# Patient Record
Sex: MALE | Race: OTHER | HISPANIC OR LATINO | Employment: FULL TIME | URBAN - METROPOLITAN AREA
[De-identification: names, ages, dates, MRNs, and addresses within clinical notes are randomized per-mention and may not be internally consistent; named-entity substitution may affect disease eponyms.]

---

## 2017-01-20 ENCOUNTER — APPOINTMENT (OUTPATIENT)
Dept: GENERAL RADIOLOGY | Age: 42
End: 2017-01-20
Attending: EMERGENCY MEDICINE
Payer: COMMERCIAL

## 2017-01-20 ENCOUNTER — HOSPITAL ENCOUNTER (EMERGENCY)
Age: 42
Discharge: HOME | End: 2017-01-21
Attending: EMERGENCY MEDICINE
Payer: COMMERCIAL

## 2017-01-20 DIAGNOSIS — R68.89 FLU-LIKE SYMPTOMS: ICD-10-CM

## 2017-01-20 DIAGNOSIS — J20.9 ACUTE BRONCHITIS, UNSPECIFIED ORGANISM: Primary | ICD-10-CM

## 2017-01-20 LAB
FLUAV AG NPH QL IA: NEGATIVE
FLUBV AG NPH QL IA: NEGATIVE

## 2017-01-20 PROCEDURE — 96375 TX/PRO/DX INJ NEW DRUG ADDON: CPT | Performed by: EMERGENCY MEDICINE

## 2017-01-20 PROCEDURE — 74011250636 HC RX REV CODE- 250/636: Performed by: EMERGENCY MEDICINE

## 2017-01-20 PROCEDURE — 96374 THER/PROPH/DIAG INJ IV PUSH: CPT | Performed by: EMERGENCY MEDICINE

## 2017-01-20 PROCEDURE — 99284 EMERGENCY DEPT VISIT MOD MDM: CPT | Performed by: EMERGENCY MEDICINE

## 2017-01-20 PROCEDURE — 71020 XR CHEST PA LAT: CPT

## 2017-01-20 PROCEDURE — 87804 INFLUENZA ASSAY W/OPTIC: CPT | Performed by: EMERGENCY MEDICINE

## 2017-01-20 PROCEDURE — 96361 HYDRATE IV INFUSION ADD-ON: CPT | Performed by: EMERGENCY MEDICINE

## 2017-01-20 PROCEDURE — 74011250637 HC RX REV CODE- 250/637: Performed by: EMERGENCY MEDICINE

## 2017-01-20 RX ORDER — DOXYCYCLINE 100 MG/1
100 CAPSULE ORAL
Status: COMPLETED | OUTPATIENT
Start: 2017-01-20 | End: 2017-01-21

## 2017-01-20 RX ORDER — DEXAMETHASONE SODIUM PHOSPHATE 100 MG/10ML
10 INJECTION INTRAMUSCULAR; INTRAVENOUS ONCE
Status: COMPLETED | OUTPATIENT
Start: 2017-01-20 | End: 2017-01-20

## 2017-01-20 RX ORDER — BENZONATATE 200 MG/1
200 CAPSULE ORAL
Qty: 21 CAP | Refills: 0 | Status: SHIPPED | OUTPATIENT
Start: 2017-01-20 | End: 2017-01-27

## 2017-01-20 RX ORDER — NAPROXEN SODIUM 550 MG/1
550 TABLET ORAL 2 TIMES DAILY WITH MEALS
Qty: 20 TAB | Refills: 0 | Status: SHIPPED | OUTPATIENT
Start: 2017-01-20 | End: 2019-02-04

## 2017-01-20 RX ORDER — ALBUTEROL SULFATE 90 UG/1
2 AEROSOL, METERED RESPIRATORY (INHALATION)
Qty: 1 INHALER | Refills: 0 | Status: SHIPPED | OUTPATIENT
Start: 2017-01-20 | End: 2019-02-04

## 2017-01-20 RX ORDER — BENZONATATE 100 MG/1
200 CAPSULE ORAL
Status: COMPLETED | OUTPATIENT
Start: 2017-01-20 | End: 2017-01-20

## 2017-01-20 RX ORDER — KETOROLAC TROMETHAMINE 30 MG/ML
30 INJECTION, SOLUTION INTRAMUSCULAR; INTRAVENOUS
Status: COMPLETED | OUTPATIENT
Start: 2017-01-20 | End: 2017-01-20

## 2017-01-20 RX ORDER — DOXYCYCLINE HYCLATE 100 MG
100 TABLET ORAL 2 TIMES DAILY
Qty: 14 TAB | Refills: 0 | Status: SHIPPED | OUTPATIENT
Start: 2017-01-20 | End: 2017-01-27

## 2017-01-20 RX ADMIN — SODIUM CHLORIDE 1000 ML: 900 INJECTION, SOLUTION INTRAVENOUS at 23:40

## 2017-01-20 RX ADMIN — KETOROLAC TROMETHAMINE 30 MG: 30 INJECTION, SOLUTION INTRAMUSCULAR; INTRAVENOUS at 23:40

## 2017-01-20 RX ADMIN — BENZONATATE 200 MG: 100 CAPSULE ORAL at 23:40

## 2017-01-20 RX ADMIN — DEXAMETHASONE SODIUM PHOSPHATE 10 MG: 10 INJECTION INTRAMUSCULAR; INTRAVENOUS at 23:40

## 2017-01-21 VITALS
SYSTOLIC BLOOD PRESSURE: 106 MMHG | HEART RATE: 102 BPM | TEMPERATURE: 99.1 F | RESPIRATION RATE: 18 BRPM | OXYGEN SATURATION: 96 % | HEIGHT: 64 IN | WEIGHT: 148 LBS | DIASTOLIC BLOOD PRESSURE: 64 MMHG | BODY MASS INDEX: 25.27 KG/M2

## 2017-01-21 PROCEDURE — 74011250637 HC RX REV CODE- 250/637: Performed by: EMERGENCY MEDICINE

## 2017-01-21 RX ADMIN — DOXYCYCLINE HYCLATE 100 MG: 100 CAPSULE ORAL at 00:10

## 2017-01-21 NOTE — ED PROVIDER NOTES
Patient is a 37 y.o. male presenting with cough. The history is provided by the patient. Cough   The current episode started more than 2 days ago. The problem occurs constantly. The problem has not changed since onset. The cough is productive of sputum. Patient reports a subjective fever - was not measured. Associated symptoms include headaches, sore throat, myalgias and shortness of breath. Pertinent negatives include no chest pain, no eye redness, no rhinorrhea, no wheezing, no nausea and no vomiting. He has tried nothing for the symptoms. He is a smoker. His past medical history does not include pneumonia or asthma. Past Medical History:   Diagnosis Date    GERD (gastroesophageal reflux disease)     Hypercholesterolemia        History reviewed. No pertinent past surgical history. History reviewed. No pertinent family history. Social History     Social History    Marital status:      Spouse name: N/A    Number of children: N/A    Years of education: N/A     Occupational History    Not on file. Social History Main Topics    Smoking status: Current Every Day Smoker    Smokeless tobacco: Never Used    Alcohol use Not on file    Drug use: Not on file    Sexual activity: Not on file     Other Topics Concern    Not on file     Social History Narrative         ALLERGIES: Review of patient's allergies indicates no known allergies. Review of Systems   Constitutional: Positive for fatigue and fever. HENT: Positive for sore throat. Negative for rhinorrhea. Eyes: Negative for discharge and redness. Respiratory: Positive for cough and shortness of breath. Negative for wheezing. Cardiovascular: Negative for chest pain and palpitations. Gastrointestinal: Negative for abdominal pain, nausea and vomiting. Musculoskeletal: Positive for arthralgias, back pain and myalgias. Skin: Negative for rash. Neurological: Positive for headaches. Negative for dizziness.    All other systems reviewed and are negative. Vitals:    01/20/17 2124   BP: 133/77   Pulse: (!) 102   Resp: 18   Temp: 99.1 °F (37.3 °C)   SpO2: 97%   Weight: 67.1 kg (148 lb)   Height: 5' 4\" (1.626 m)            Physical Exam   Constitutional: He is oriented to person, place, and time. He appears well-developed and well-nourished. No distress. Appears to feel a little puny, but no distress     HENT:   Head: Normocephalic and atraumatic. Eyes: Conjunctivae and EOM are normal. Pupils are equal, round, and reactive to light. Right eye exhibits no discharge. Left eye exhibits no discharge. No scleral icterus. Neck: Normal range of motion. Neck supple. Cardiovascular: Normal rate, regular rhythm and normal heart sounds. Exam reveals no gallop. No murmur heard. Pulmonary/Chest: Effort normal and breath sounds normal. No respiratory distress. He has no wheezes. He has no rales. He exhibits no tenderness. Abdominal: Soft. Bowel sounds are normal. There is no tenderness. There is no guarding. Musculoskeletal: Normal range of motion. He exhibits no edema. Neurological: He is alert and oriented to person, place, and time. He exhibits normal muscle tone. cni 2-12 grossly   Skin: Skin is warm and dry. He is not diaphoretic. Psychiatric: He has a normal mood and affect. His behavior is normal.   Nursing note and vitals reviewed. MDM  Number of Diagnoses or Management Options  Diagnosis management comments: Medical decision making note:  Cough and body aches  Flu test negative. Chest x-ray negative  Treat for bronchitis  This concludes the \"medical decision making note\" part of this emergency department visit note.       ED Course       Procedures

## 2017-01-21 NOTE — DISCHARGE INSTRUCTIONS
Use inhlaer 2 puffs every 6 hours  1,200mg mucinex DM every 12 hours for a week. Try a sustained release sudafed every morning,  Quit smoking  Drink plenty of fluids  Bronchitis: Care Instructions  Your Care Instructions    Bronchitis is inflammation of the bronchial tubes, which carry air to the lungs. The tubes swell and produce mucus, or phlegm. The mucus and inflamed bronchial tubes make you cough. You may have trouble breathing. Most cases of bronchitis are caused by viruses like those that cause colds. Antibiotics usually do not help and they may be harmful. Bronchitis usually develops rapidly and lasts about 2 to 3 weeks in otherwise healthy people. Follow-up care is a key part of your treatment and safety. Be sure to make and go to all appointments, and call your doctor if you are having problems. It's also a good idea to know your test results and keep a list of the medicines you take. How can you care for yourself at home? · Take all medicines exactly as prescribed. Call your doctor if you think you are having a problem with your medicine. · Get some extra rest.  · Take an over-the-counter pain medicine, such as acetaminophen (Tylenol), ibuprofen (Advil, Motrin), or naproxen (Aleve) to reduce fever and relieve body aches. Read and follow all instructions on the label. · Do not take two or more pain medicines at the same time unless the doctor told you to. Many pain medicines have acetaminophen, which is Tylenol. Too much acetaminophen (Tylenol) can be harmful. · Take an over-the-counter cough medicine that contains dextromethorphan to help quiet a dry, hacking cough so that you can sleep. Avoid cough medicines that have more than one active ingredient. Read and follow all instructions on the label. · Breathe moist air from a humidifier, hot shower, or sink filled with hot water. The heat and moisture will thin mucus so you can cough it out. · Do not smoke. Smoking can make bronchitis worse. If you need help quitting, talk to your doctor about stop-smoking programs and medicines. These can increase your chances of quitting for good. When should you call for help? Call 911 anytime you think you may need emergency care. For example, call if:  · You have severe trouble breathing. Call your doctor now or seek immediate medical care if:  · You have new or worse trouble breathing. · You cough up dark brown or bloody mucus (sputum). · You have a new or higher fever. · You have a new rash. Watch closely for changes in your health, and be sure to contact your doctor if:  · You cough more deeply or more often, especially if you notice more mucus or a change in the color of your mucus. · You are not getting better as expected. Where can you learn more? Go to http://harvey-roma.info/. Enter H333 in the search box to learn more about \"Bronchitis: Care Instructions. \"  Current as of: May 23, 2016  Content Version: 11.1  © 6633-9963 [employer]. Care instructions adapted under license by [employer] (which disclaims liability or warranty for this information). If you have questions about a medical condition or this instruction, always ask your healthcare professional. [place] [address] any warranty or liability for your use of this information.

## 2017-01-21 NOTE — ED NOTES
I have reviewed discharge instructions with the patient. Patient verbalizes understanding. Opportunity for questions provided. Prescriptions in hand. Patient ambulatory off the unit. No distress noted at this time.

## 2017-01-21 NOTE — ED TRIAGE NOTES
Patient states that he has been feeling sick for the past few days. States productive cough with yellow sputum.   Also complains of HA and general body aches

## 2019-02-04 ENCOUNTER — APPOINTMENT (OUTPATIENT)
Dept: GENERAL RADIOLOGY | Age: 44
End: 2019-02-04
Payer: COMMERCIAL

## 2019-02-04 ENCOUNTER — HOSPITAL ENCOUNTER (EMERGENCY)
Age: 44
Discharge: HOME | End: 2019-02-04
Attending: EMERGENCY MEDICINE
Payer: COMMERCIAL

## 2019-02-04 VITALS
BODY MASS INDEX: 28.17 KG/M2 | WEIGHT: 165 LBS | DIASTOLIC BLOOD PRESSURE: 86 MMHG | OXYGEN SATURATION: 98 % | HEIGHT: 64 IN | RESPIRATION RATE: 20 BRPM | HEART RATE: 88 BPM | TEMPERATURE: 99.2 F | SYSTOLIC BLOOD PRESSURE: 133 MMHG

## 2019-02-04 DIAGNOSIS — M62.830 LUMBAR PARASPINAL MUSCLE SPASM: Primary | ICD-10-CM

## 2019-02-04 PROCEDURE — 75810000123 HC INJ'S ANES/STEROID AGT PERIPH NERVE: Performed by: EMERGENCY MEDICINE

## 2019-02-04 PROCEDURE — 99282 EMERGENCY DEPT VISIT SF MDM: CPT | Performed by: EMERGENCY MEDICINE

## 2019-02-04 PROCEDURE — 74011000250 HC RX REV CODE- 250: Performed by: EMERGENCY MEDICINE

## 2019-02-04 RX ORDER — BUPIVACAINE HYDROCHLORIDE 2.5 MG/ML
10 INJECTION, SOLUTION EPIDURAL; INFILTRATION; INTRACAUDAL ONCE
Status: DISCONTINUED | OUTPATIENT
Start: 2019-02-04 | End: 2019-02-04 | Stop reason: ALTCHOICE

## 2019-02-04 RX ORDER — CYCLOBENZAPRINE HCL 10 MG
10 TABLET ORAL
Qty: 12 TAB | Refills: 0 | Status: SHIPPED | OUTPATIENT
Start: 2019-02-04 | End: 2019-07-01

## 2019-02-04 RX ORDER — LIDOCAINE 50 MG/G
1 PATCH TOPICAL EVERY 24 HOURS
Qty: 5 EACH | Refills: 0 | Status: SHIPPED | OUTPATIENT
Start: 2019-02-04 | End: 2020-06-20

## 2019-02-04 RX ORDER — IBUPROFEN 600 MG/1
600 TABLET ORAL
Qty: 21 TAB | Refills: 0 | Status: SHIPPED | OUTPATIENT
Start: 2019-02-04 | End: 2019-02-11

## 2019-02-04 RX ORDER — BUPIVACAINE HYDROCHLORIDE 7.5 MG/ML
10 INJECTION, SOLUTION EPIDURAL; RETROBULBAR ONCE
Status: COMPLETED | OUTPATIENT
Start: 2019-02-04 | End: 2019-02-04

## 2019-02-04 RX ADMIN — BUPIVACAINE HYDROCHLORIDE 75 MG: 7.5 INJECTION, SOLUTION EPIDURAL; RETROBULBAR at 13:45

## 2019-02-04 NOTE — ED NOTES
I have reviewed discharge instructions with the patient. The patient verbalized understanding. Patient left ED via Discharge Method: ambulatory to Home with self. Opportunity for questions and clarification provided. Patient given 3 scripts. To continue your aftercare when you leave the hospital, you may receive an automated call from our care team to check in on how you are doing. This is a free service and part of our promise to provide the best care and service to meet your aftercare needs.  If you have questions, or wish to unsubscribe from this service please call [phone]. Thank you for Choosing our [facility] Emergency Department.

## 2019-02-04 NOTE — ED PROVIDER NOTES
Victoriano Morales is a 44 y.o. male seen on 2/4/2019 at 1:22 PM in the [facility] EMERGENCY DEPT in room V03/V03. Chief Complaint Patient presents with  Back Pain HPI:  L lower back pain x 1-2 weeks. Hx of remote back injury. Notes this happens about once a year, worse with movement. No recent hx of trauma. No numbness or tingling between legs. No weakness in legs. Has not taken anything at home. No incontinence. No fever. Historian: patient REVIEW OF SYSTEMS Review of Systems Genitourinary: Negative for flank pain and frequency. Musculoskeletal: Positive for back pain. Negative for joint swelling, myalgias, neck pain and neck stiffness. PAST MEDICAL HISTORY Past Medical History:  
Diagnosis Date  GERD (gastroesophageal reflux disease)  Hypercholesterolemia History reviewed. No pertinent surgical history. Social History Socioeconomic History  Marital status:  Spouse name: Not on file  Number of children: Not on file  Years of education: Not on file  Highest education level: Not on file Tobacco Use  Smoking status: Current Every Day Smoker Packs/day: 0.25 Years: 0.50 Pack years: 0.12  Smokeless tobacco: Never Used Substance and Sexual Activity  Alcohol use: No  
  Alcohol/week: 0.0 oz  Drug use: No  
Social History Narrative ** Merged History Encounter **  
    
 ** Merged History Encounter ** None No Known Allergies PHYSICAL EXAM    
 
Vitals:  
 02/04/19 1150 BP: 133/86 Pulse: 88 Resp: 20 Temp: 99.2 °F (37.3 °C) SpO2: 98% Vital signs were reviewed. Physical Exam  
Constitutional: He is oriented to person, place, and time. He appears well-developed and well-nourished. No distress. HENT:  
Head: Normocephalic and atraumatic. Eyes: EOM are normal. Pupils are equal, round, and reactive to light. Neck: Normal range of motion. Cardiovascular: Intact distal pulses. Pulmonary/Chest: Effort normal.  
Musculoskeletal: He exhibits no edema or deformity. Limited range of motion of lumbar spine, palpable muscle spasm of left lumbar paraspinous musculature, TTP, no midline TTP, no stepoffs or deformities. Neurological: He is alert and oriented to person, place, and time. No cranial nerve deficit or sensory deficit. He exhibits normal muscle tone. Coordination normal.  
5+ strength in bilateral LE include dorsi/plantar flex of toes bilaterally, no sensory deficits including in perineum, Psychiatric: He has a normal mood and affect. His behavior is normal.  
Vitals reviewed. MEDICAL DECISION MAKING Differential Diagnosis: MDM Procedures ED Course:  Pt has palpable muscle spasm. No recent trauma. No imagine necessary. Will provide trigger point injection of marcaine given the significance of his muscle spasm. Will give lidoderm patch, flexeril, and ibuprofen. Ice back. Pt presents to ER complaining of back pain. No history of trauma, no midline TTP. No indication for imaging at this time. No saddle anesthesia, no weakness in lower extremities, no incontinence. I have very low suspicion for caudae equina, acute cord compression syndrome, or other acute process at this time. Return precautions have been given. I have recommended early stretching and mobilization, anti-inflammatory tx's, and fu with primary care. Procedure Note: Trigger point injection Risks and benefits discussed. Area was prepped with chloraprep. 2.5ml of 75% marcaine was instilled into the spasmed muscle at two separate levels for total of 5mL . Pt tolerated well. Disposition:  Dc to home Diagnosis:  Acute lumbar muscle spasm 
____________________________________________________________________ A portion of this note was generated using voice recognition dictation software. While the note has been reviewed for accuracy, please note certain words and phrasses may not be transcribed as intended that some grammatical and/or typographical errors may be present.

## 2019-03-15 ENCOUNTER — HOSPITAL ENCOUNTER (OUTPATIENT)
Dept: MRI IMAGING | Age: 44
Discharge: HOME | End: 2019-03-15
Attending: FAMILY MEDICINE
Payer: COMMERCIAL

## 2019-03-15 DIAGNOSIS — G89.29 CHRONIC BILATERAL LOW BACK PAIN WITH RIGHT-SIDED SCIATICA: ICD-10-CM

## 2019-03-15 DIAGNOSIS — M54.41 CHRONIC BILATERAL LOW BACK PAIN WITH RIGHT-SIDED SCIATICA: ICD-10-CM

## 2019-03-15 PROCEDURE — 72146 MRI CHEST SPINE W/O DYE: CPT

## 2019-05-02 ENCOUNTER — HOSPITAL ENCOUNTER (EMERGENCY)
Age: 44
Discharge: HOME | End: 2019-05-02
Payer: COMMERCIAL

## 2019-05-02 VITALS
RESPIRATION RATE: 20 BRPM | OXYGEN SATURATION: 95 % | BODY MASS INDEX: 26.12 KG/M2 | HEART RATE: 105 BPM | DIASTOLIC BLOOD PRESSURE: 90 MMHG | WEIGHT: 153 LBS | HEIGHT: 64 IN | SYSTOLIC BLOOD PRESSURE: 141 MMHG | TEMPERATURE: 98.5 F

## 2019-05-02 DIAGNOSIS — J02.9 PHARYNGITIS, UNSPECIFIED ETIOLOGY: Primary | ICD-10-CM

## 2019-05-02 PROCEDURE — 99283 EMERGENCY DEPT VISIT LOW MDM: CPT

## 2019-05-02 RX ORDER — AMOXICILLIN 500 MG/1
500 TABLET, FILM COATED ORAL 3 TIMES DAILY
Qty: 30 TAB | Refills: 0 | Status: SHIPPED | OUTPATIENT
Start: 2019-05-02 | End: 2020-06-20

## 2019-05-02 NOTE — DISCHARGE INSTRUCTIONS
Patient Education        Sore Throat: Care Instructions  Your Care Instructions    Infection by bacteria or a virus causes most sore throats. Cigarette smoke, dry air, air pollution, allergies, and yelling can also cause a sore throat. Sore throats can be painful and annoying. Fortunately, most sore throats go away on their own. If you have a bacterial infection, your doctor may prescribe antibiotics. Follow-up care is a key part of your treatment and safety. Be sure to make and go to all appointments, and call your doctor if you are having problems. It's also a good idea to know your test results and keep a list of the medicines you take. How can you care for yourself at home? · If your doctor prescribed antibiotics, take them as directed. Do not stop taking them just because you feel better. You need to take the full course of antibiotics. · Gargle with warm salt water once an hour to help reduce swelling and relieve discomfort. Use 1 teaspoon of salt mixed in 1 cup of warm water. · Take an over-the-counter pain medicine, such as acetaminophen (Tylenol), ibuprofen (Advil, Motrin), or naproxen (Aleve). Read and follow all instructions on the label. · Be careful when taking over-the-counter cold or flu medicines and Tylenol at the same time. Many of these medicines have acetaminophen, which is Tylenol. Read the labels to make sure that you are not taking more than the recommended dose. Too much acetaminophen (Tylenol) can be harmful. · Drink plenty of fluids. Fluids may help soothe an irritated throat. Hot fluids, such as tea or soup, may help decrease throat pain. · Use over-the-counter throat lozenges to soothe pain. Regular cough drops or hard candy may also help. These should not be given to young children because of the risk of choking. · Do not smoke or allow others to smoke around you. If you need help quitting, talk to your doctor about stop-smoking programs and medicines.  These can increase your chances of quitting for good. · Use a vaporizer or humidifier to add moisture to your bedroom. Follow the directions for cleaning the machine. When should you call for help? Call your doctor now or seek immediate medical care if:    · You have new or worse trouble swallowing.     · Your sore throat gets much worse on one side.    Watch closely for changes in your health, and be sure to contact your doctor if you do not get better as expected. Where can you learn more? Go to http://harvey-roma.info/. Enter 062 [id number] in the search box to learn more about \"Sore Throat: Care Instructions. \"  Current as of: March 27, 2018  Content Version: 11.9  © 2006-2018 [employer]. Care instructions adapted under license by [employer] (which disclaims liability or warranty for this information). If you have questions about a medical condition or this instruction, always ask your healthcare professional. [place] [address] any warranty or liability for your use of this information. Patient Education        Dolor de garganta: Instrucciones de cuidado - [ Sore Throat: Care Instructions ]  Instrucciones de cuidado    Sumeet infección por un virus o sumeet bacteria causa la mayoría de los afshan de garganta. El humo del cigarrillo, el aire seco, el aire contaminado, las [place] y gritar también pueden causar dolor de garganta. El dolor de garganta puede ser intenso y [place]. Por jamari, la mayoría de los afshan de garganta desaparecen por sí mismos. Si tiene [employer], el médico podría recetarle antibióticos. La atención de seguimiento es sumeet parte clave de paiz tratamiento y seguridad. Asegúrese de hacer y acudir a todas las citas, y llame a paiz médico si está teniendo problemas. También es sumeet buena idea saber los resultados de delia exámenes y mantener sumeet lista de los medicamentos que beata. ¿Cómo puede cuidarse en el hogar?   · Si paiz médico le recetó antibióticos, tómelos según las indicaciones. No deje de tomarlos por el hecho de sentirse mejor. Debe peewee todos los antibióticos hasta terminarlos. · Marco A gárgaras de agua salada tibia sumeet vez cada hora para ayudar a reducir la hinchazón y aliviar la molestia. Mezcle 1 cucharadita de sal en 1 taza de agua tibia. · [place] un analgésico (medicamento para el dolor) de venta vasiliy, gita acetaminofén (Tylenol), ibuprofeno (Advil, Motrin) o naproxeno (Aleve). Brittany y siga todas las instrucciones de la [place]. · Tenga cuidado cuando tome medicamentos de venta vasiliy para el resfriado o la gripe y Tylenol al [employer]. Muchos de estos medicamentos contienen acetaminofén, o sea, Tylenol. Brittany las etiquetas para asegurarse de que no está tomando sumeet dosis mayor que la recomendada. El exceso de acetaminofén (Tylenol) puede ser dañino. · [place] abundantes líquidos. Los líquidos podrían ayudar a aliviar la irritación de la garganta. Beber líquidos calientes, gita té o sopa, podría ayudarle a reducir el dolor de garganta. · Chupe pastillas para la garganta de venta vasiliy para aliviar el dolor. Los caramelos duros o los caramelos regulares para la tos también podrían ayudar. Nunca se los dé a un rika pequeño porque corre el riesgo de atragantarse. · No fume ni permita que otros fumen cerca de usted. Si necesita ayuda para dejar de fumar, hable con paiz médico [employer] y medicamentos para dejar de fumar. Estos pueden aumentar delia probabilidades de dejar el hábito para siempre. · Use un humidificador o vaporizador para añadir humedad en paiz dormitorio. Siga las instrucciones para limpiar el aparato. ¿Cuándo debe pedir ayuda?   Llame a paiz médico ahora mismo o busque atención médica inmediata si:    · Tiene dificultades para tragar o estas empeoran.     · El dolor de garganta empeora mucho en un lado.    Preste especial atención a los cambios en paiz tawanda y asegúrese de comunicarse con paiz médico si no mejora gita se esperaba. ¿Dónde puede encontrar más información en inglés? Nikhil Campos a http://harvey-roma.info/. Jadiel Mckeondemetria [id number] en la búsqueda para aprender más acerca de \"Dolor de garganta: Instrucciones de cuidado - [ Sore Throat: Care Instructions ]. \"  Revisado: [place] [address], 2018  Versión del contenido: 11.9  © 0906-4315 Healthwise, Incorporated. Las instrucciones de cuidado fueron adaptadas bajo licencia por [employer] (which disclaims liability or warranty for this information). Si usted tiene [place] afección médica o sobre estas instrucciones, siempre pregunte a paiz profesional de tawanda. Healthwise, Incorporated niega toda garantía o responsabilidad por paiz uso de esta información.

## 2019-05-02 NOTE — ED TRIAGE NOTES
C/o sore throat, reports left side. Reports left ear pain. Onset yesterday with progressive worsening. Reports difficulty swallowing

## 2019-05-02 NOTE — ED PROVIDER NOTES
42-year-old male complaining of sore throat. Patient's wife has strep throat diagnosed. The history is provided by the patient. Sore Throat The current episode started 12 to 24 hours ago. There has been a fever of 101 - 101.9 F. He has had exposure to strep. Past Medical History:  
Diagnosis Date  GERD (gastroesophageal reflux disease)  GERD (gastroesophageal reflux disease)  Hypercholesterolemia No past surgical history on file. Family History:  
Problem Relation Age of Onset  Diabetes Mother  Cancer Father  Diabetes Maternal Grandmother Social History Socioeconomic History  Marital status:  Spouse name: Not on file  Number of children: Not on file  Years of education: Not on file  Highest education level: Not on file Occupational History  Not on file Social Needs  Financial resource strain: Not on file  Food insecurity:  
  Worry: Not on file Inability: Not on file  Transportation needs:  
  Medical: Not on file Non-medical: Not on file Tobacco Use  Smoking status: Current Every Day Smoker Packs/day: 0.25 Years: 0.50 Pack years: 0.12  Smokeless tobacco: Never Used Substance and Sexual Activity  Alcohol use: No  
  Alcohol/week: 0.0 oz  Drug use: No  
 Sexual activity: Yes  
  Partners: Female Lifestyle  Physical activity:  
  Days per week: Not on file Minutes per session: Not on file  Stress: Not on file Relationships  Social connections:  
  Talks on phone: Not on file Gets together: Not on file Attends [employer] service: Not on file Active member of club or organization: Not on file Attends meetings of clubs or organizations: Not on file Relationship status: Not on file  Intimate partner violence:  
  Fear of current or ex partner: Not on file Emotionally abused: Not on file Physically abused: Not on file Forced sexual activity: Not on file Other Topics Concern  Not on file Social History Narrative ** Merged History Encounter **  
    
 ** Merged History Encounter ** ALLERGIES: Patient has no known allergies. Review of Systems Constitutional: Negative. Negative for activity change. HENT: Positive for sore throat. Eyes: Negative. Respiratory: Negative. Cardiovascular: Negative. Gastrointestinal: Negative. Genitourinary: Negative. Musculoskeletal: Negative. Skin: Negative. Neurological: Negative. Psychiatric/Behavioral: Negative. All other systems reviewed and are negative. Vitals:  
 05/02/19 0122 BP: (!) 138/92 Pulse: (!) 112 Resp: 20 Temp: 99.3 °F (37.4 °C) SpO2: 97% Weight: 69.4 kg (153 lb) Height: 5' 4\" (1.626 m) Physical Exam  
Constitutional: He is oriented to person, place, and time. He appears well-developed and well-nourished. No distress. HENT:  
Head: Normocephalic and atraumatic. Right Ear: External ear normal.  
Left Ear: External ear normal.  
Nose: Nose normal.  
Mouth/Throat: Posterior oropharyngeal erythema present. No oropharyngeal exudate. Eyes: Pupils are equal, round, and reactive to light. Conjunctivae and EOM are normal. Right eye exhibits no discharge. Left eye exhibits no discharge. No scleral icterus. Neck: Normal range of motion. Neck supple. No JVD present. No tracheal deviation present. Cardiovascular: Normal rate, regular rhythm and intact distal pulses. Pulmonary/Chest: Effort normal and breath sounds normal. No stridor. No respiratory distress. He has no wheezes. He exhibits no tenderness. Abdominal: Soft. Bowel sounds are normal. He exhibits no distension and no mass. There is no tenderness. Musculoskeletal: Normal range of motion. He exhibits no edema or tenderness. Neurological: He is alert and oriented to person, place, and time. No cranial nerve deficit. Skin: Skin is warm and dry. No rash noted. He is not diaphoretic. No erythema. No pallor. Psychiatric: He has a normal mood and affect. His behavior is normal. Thought content normal.  
Nursing note and vitals reviewed. MDM Number of Diagnoses or Management Options Pharyngitis, unspecified etiology:  
Diagnosis management comments: Assessment exposure to strep sore throat will treat for strep throat. Procedures

## 2019-06-14 ENCOUNTER — APPOINTMENT (OUTPATIENT)
Dept: CT IMAGING | Age: 44
End: 2019-06-14
Payer: COMMERCIAL

## 2019-06-14 ENCOUNTER — HOSPITAL ENCOUNTER (EMERGENCY)
Age: 44
Discharge: HOME | End: 2019-06-14
Payer: COMMERCIAL

## 2019-06-14 VITALS
WEIGHT: 145 LBS | HEART RATE: 77 BPM | TEMPERATURE: 98.1 F | DIASTOLIC BLOOD PRESSURE: 71 MMHG | OXYGEN SATURATION: 94 % | SYSTOLIC BLOOD PRESSURE: 129 MMHG | HEIGHT: 64 IN | BODY MASS INDEX: 24.75 KG/M2 | RESPIRATION RATE: 18 BRPM

## 2019-06-14 DIAGNOSIS — K57.32 DIVERTICULITIS OF LARGE INTESTINE WITHOUT PERFORATION OR ABSCESS WITHOUT BLEEDING: Primary | ICD-10-CM

## 2019-06-14 LAB
ALBUMIN SERPL-MCNC: 4.3 G/DL (ref 3.5–5)
ALBUMIN/GLOB SERPL: 1.3 {RATIO} (ref 1.2–3.5)
ALP SERPL-CCNC: 116 U/L (ref 50–136)
ALT SERPL-CCNC: 95 U/L (ref 12–65)
ANION GAP SERPL CALC-SCNC: 6 MMOL/L (ref 7–16)
AST SERPL-CCNC: 38 U/L (ref 15–37)
BASOPHILS # BLD: 0 K/UL (ref 0–0.2)
BASOPHILS NFR BLD: 0 % (ref 0–2)
BILIRUB SERPL-MCNC: 0.3 MG/DL (ref 0.2–1.1)
BUN SERPL-MCNC: 11 MG/DL (ref 6–23)
CALCIUM SERPL-MCNC: 9.2 MG/DL (ref 8.3–10.4)
CHLORIDE SERPL-SCNC: 106 MMOL/L (ref 98–107)
CO2 SERPL-SCNC: 27 MMOL/L (ref 21–32)
CREAT SERPL-MCNC: 0.98 MG/DL (ref 0.8–1.5)
DIFFERENTIAL METHOD BLD: ABNORMAL
EOSINOPHIL # BLD: 0.3 K/UL (ref 0–0.8)
EOSINOPHIL NFR BLD: 2 % (ref 0.5–7.8)
ERYTHROCYTE [DISTWIDTH] IN BLOOD BY AUTOMATED COUNT: 13.8 % (ref 11.9–14.6)
GLOBULIN SER CALC-MCNC: 3.3 G/DL (ref 2.3–3.5)
GLUCOSE SERPL-MCNC: 142 MG/DL (ref 65–100)
HCT VFR BLD AUTO: 45.8 % (ref 41.1–50.3)
HGB BLD-MCNC: 15.6 G/DL (ref 13.6–17.2)
IMM GRANULOCYTES # BLD AUTO: 0 K/UL (ref 0–0.5)
IMM GRANULOCYTES NFR BLD AUTO: 0 % (ref 0–5)
LYMPHOCYTES # BLD: 4.5 K/UL (ref 0.5–4.6)
LYMPHOCYTES NFR BLD: 33 % (ref 13–44)
MCH RBC QN AUTO: 31.6 PG (ref 26.1–32.9)
MCHC RBC AUTO-ENTMCNC: 34.1 G/DL (ref 31.4–35)
MCV RBC AUTO: 92.7 FL (ref 79.6–97.8)
MONOCYTES # BLD: 1.1 K/UL (ref 0.1–1.3)
MONOCYTES NFR BLD: 8 % (ref 4–12)
NEUTS SEG # BLD: 7.6 K/UL (ref 1.7–8.2)
NEUTS SEG NFR BLD: 57 % (ref 43–78)
NRBC # BLD: 0 K/UL (ref 0–0.2)
PLATELET # BLD AUTO: 221 K/UL (ref 150–450)
PLATELET COMMENTS,PCOM: ADEQUATE
PMV BLD AUTO: 9.9 FL (ref 9.4–12.3)
POTASSIUM SERPL-SCNC: 3.7 MMOL/L (ref 3.5–5.1)
PROT SERPL-MCNC: 7.6 G/DL (ref 6.3–8.2)
RBC # BLD AUTO: 4.94 M/UL (ref 4.23–5.6)
RBC MORPH BLD: ABNORMAL
SODIUM SERPL-SCNC: 139 MMOL/L (ref 136–145)
WBC # BLD AUTO: 13.5 K/UL (ref 4.3–11.1)
WBC MORPH BLD: ABNORMAL

## 2019-06-14 PROCEDURE — 80053 COMPREHEN METABOLIC PANEL: CPT

## 2019-06-14 PROCEDURE — 74011250636 HC RX REV CODE- 250/636

## 2019-06-14 PROCEDURE — 81003 URINALYSIS AUTO W/O SCOPE: CPT

## 2019-06-14 PROCEDURE — 99284 EMERGENCY DEPT VISIT MOD MDM: CPT

## 2019-06-14 PROCEDURE — 74011250637 HC RX REV CODE- 250/637

## 2019-06-14 PROCEDURE — 74176 CT ABD & PELVIS W/O CONTRAST: CPT

## 2019-06-14 PROCEDURE — 96375 TX/PRO/DX INJ NEW DRUG ADDON: CPT

## 2019-06-14 PROCEDURE — 85025 COMPLETE CBC W/AUTO DIFF WBC: CPT

## 2019-06-14 PROCEDURE — 96365 THER/PROPH/DIAG IV INF INIT: CPT

## 2019-06-14 RX ORDER — METRONIDAZOLE 500 MG/1
500 TABLET ORAL
Status: COMPLETED | OUTPATIENT
Start: 2019-06-14 | End: 2019-06-14

## 2019-06-14 RX ORDER — HYDROCODONE BITARTRATE AND ACETAMINOPHEN 5; 325 MG/1; MG/1
1 TABLET ORAL
Qty: 9 TAB | Refills: 0 | Status: SHIPPED | OUTPATIENT
Start: 2019-06-14 | End: 2019-06-17

## 2019-06-14 RX ORDER — KETOROLAC TROMETHAMINE 30 MG/ML
30 INJECTION, SOLUTION INTRAMUSCULAR; INTRAVENOUS
Status: COMPLETED | OUTPATIENT
Start: 2019-06-14 | End: 2019-06-14

## 2019-06-14 RX ORDER — CIPROFLOXACIN 500 MG/1
500 TABLET ORAL 2 TIMES DAILY
Qty: 14 TAB | Refills: 0 | Status: SHIPPED | OUTPATIENT
Start: 2019-06-14 | End: 2019-06-21

## 2019-06-14 RX ORDER — CIPROFLOXACIN 2 MG/ML
400 INJECTION, SOLUTION INTRAVENOUS EVERY 12 HOURS
Status: DISCONTINUED | OUTPATIENT
Start: 2019-06-14 | End: 2019-06-14

## 2019-06-14 RX ORDER — SODIUM CHLORIDE 9 MG/ML
1000 INJECTION, SOLUTION INTRAVENOUS ONCE
Status: COMPLETED | OUTPATIENT
Start: 2019-06-14 | End: 2019-06-14

## 2019-06-14 RX ORDER — METRONIDAZOLE 500 MG/1
500 TABLET ORAL 2 TIMES DAILY
Qty: 14 TAB | Refills: 0 | Status: SHIPPED | OUTPATIENT
Start: 2019-06-14 | End: 2019-06-21

## 2019-06-14 RX ORDER — CIPROFLOXACIN 2 MG/ML
400 INJECTION, SOLUTION INTRAVENOUS EVERY 12 HOURS
Status: DISCONTINUED | OUTPATIENT
Start: 2019-06-14 | End: 2019-06-14 | Stop reason: HOSPADM

## 2019-06-14 RX ADMIN — KETOROLAC TROMETHAMINE 30 MG: 30 INJECTION, SOLUTION INTRAMUSCULAR at 02:50

## 2019-06-14 RX ADMIN — SODIUM CHLORIDE 1000 ML: 900 INJECTION, SOLUTION INTRAVENOUS at 03:15

## 2019-06-14 RX ADMIN — CIPROFLOXACIN 400 MG: 2 INJECTION, SOLUTION INTRAVENOUS at 04:00

## 2019-06-14 RX ADMIN — METRONIDAZOLE 500 MG: 500 TABLET ORAL at 04:00

## 2019-06-14 NOTE — ED TRIAGE NOTES
C/o LLQ abdominal pain. Onset yesterday morning with progressive worsening. Denies n/v/d. Denies fever or chills. Denies urinary symptoms. Denies hx of same. Increased pain with bending over. Attempted aleve pta.  Last BM approx 2030

## 2019-06-14 NOTE — ED PROVIDER NOTES
52-year-old male complaining of left lower quadrant abdominal pain radiating into his flank. Onset was gradual last 24 hours    The history is provided by the patient. Abdominal Pain    This is a new problem. The problem occurs constantly. The problem has not changed since onset. The pain is located in the LLQ. The quality of the pain is aching. The pain is at a severity of 8/10. The pain is moderate. Associated symptoms include nausea. Pertinent negatives include no fever, no diarrhea, no melena and no constipation. Nothing worsens the pain. The pain is relieved by nothing. Past Medical History:   Diagnosis Date    GERD (gastroesophageal reflux disease)     GERD (gastroesophageal reflux disease)     Hypercholesterolemia        No past surgical history on file.       Family History:   Problem Relation Age of Onset    Diabetes Mother     Cancer Father     Diabetes Maternal Grandmother        Social History     Socioeconomic History    Marital status:      Spouse name: Not on file    Number of children: Not on file    Years of education: Not on file    Highest education level: Not on file   Occupational History    Not on file   Social Needs    Financial resource strain: Not on file    Food insecurity:     Worry: Not on file     Inability: Not on file    Transportation needs:     Medical: Not on file     Non-medical: Not on file   Tobacco Use    Smoking status: Current Every Day Smoker     Packs/day: 0.25     Years: 0.50     Pack years: 0.12    Smokeless tobacco: Never Used   Substance and Sexual Activity    Alcohol use: No     Alcohol/week: 0.0 oz    Drug use: No    Sexual activity: Yes     Partners: Female   Lifestyle    Physical activity:     Days per week: Not on file     Minutes per session: Not on file    Stress: Not on file   Relationships    Social connections:     Talks on phone: Not on file     Gets together: Not on file     Attends [employer] service: Not on file Active member of club or organization: Not on file     Attends meetings of clubs or organizations: Not on file     Relationship status: Not on file    Intimate partner violence:     Fear of current or ex partner: Not on file     Emotionally abused: Not on file     Physically abused: Not on file     Forced sexual activity: Not on file   Other Topics Concern    Not on file   Social History Narrative    ** Merged History Encounter **         ** Merged History Encounter **              ALLERGIES: Patient has no known allergies. Review of Systems   Constitutional: Negative. Negative for activity change and fever. HENT: Negative. Eyes: Negative. Respiratory: Negative. Cardiovascular: Negative. Gastrointestinal: Positive for abdominal pain and nausea. Negative for constipation, diarrhea and melena. Genitourinary: Negative. Musculoskeletal: Negative. Skin: Negative. Neurological: Negative. Psychiatric/Behavioral: Negative. All other systems reviewed and are negative. Vitals:    06/14/19 0032   BP: 126/89   Pulse: 81   Resp: 18   Temp: 98.1 °F (36.7 °C)   SpO2: 98%   Weight: 65.8 kg (145 lb)   Height: 5' 4\" (1.626 m)            Physical Exam   Constitutional: He is oriented to person, place, and time. He appears well-developed and well-nourished. No distress. HENT:   Head: Normocephalic and atraumatic. Right Ear: External ear normal.   Left Ear: External ear normal.   Nose: Nose normal.   Mouth/Throat: Oropharynx is clear and moist. No oropharyngeal exudate. Eyes: Pupils are equal, round, and reactive to light. Conjunctivae and EOM are normal. Right eye exhibits no discharge. Left eye exhibits no discharge. No scleral icterus. Neck: Normal range of motion. Neck supple. No JVD present. No tracheal deviation present. Cardiovascular: Normal rate, regular rhythm and intact distal pulses. Pulmonary/Chest: Effort normal and breath sounds normal. No stridor.  No respiratory distress. He has no wheezes. He exhibits no tenderness. Abdominal: Soft. Bowel sounds are normal. He exhibits no distension and no mass. There is tenderness in the left lower quadrant. There is no rigidity and no guarding. Musculoskeletal: Normal range of motion. He exhibits no edema or tenderness. Neurological: He is alert and oriented to person, place, and time. No cranial nerve deficit. Skin: Skin is warm and dry. No rash noted. He is not diaphoretic. No erythema. No pallor. Psychiatric: He has a normal mood and affect. His behavior is normal. Thought content normal.   Nursing note and vitals reviewed. MDM  Number of Diagnoses or Management Options  Diverticulitis of large intestine without perforation or abscess without bleeding:   Diagnosis management comments: Assessment: Descending colon diverticulitis without perforation or bleeding.     Plan antibiotics and follow up with GI       Amount and/or Complexity of Data Reviewed  Clinical lab tests: ordered and reviewed  Tests in the radiology section of CPT®: ordered and reviewed  Tests in the medicine section of CPT®: ordered and reviewed           Procedures

## 2019-06-14 NOTE — DISCHARGE INSTRUCTIONS
Patient Education        Diverticulitis: Stephanie Mask - [ Diverticulitis: Care Instructions ]  Instrucciones de cuidado    La diverticulitis se presenta cuando se chuck bolsas en la pared del colon y se inflaman o infectan. [place] puede ser [employer]. Los médicos no están seguros de cuál es la causa de la diverticulitis. No hay evidencia de que alimentos gita los zenon secos, las semillas o las bayas la causen o la empeoren. Sumeet alimentación con bajo contenido de fibra puede hacer que el colon se esfuerce más para [employer]. Los sacos podrían formarse debido a lis esfuerzo adicional.  Podría ser difícil pensar en alimentarse en forma saludable cuando está experimentando dolor. Funmi a medida que se recupera, podría pensar en cómo puede alimentarse en forma saludable para tener sumeet mejor tawanda en general. Alimentarse en forma saludable puede ayudarle a evitar ataques en el futuro. La atención de seguimiento es sumeet parte clave de paiz tratamiento y seguridad. Asegúrese de hacer y acudir a todas las citas, y llame a paiz médico si está teniendo problemas. También es sumeet buena idea saber los resultados de delia exámenes y mantener sumeet lista de los medicamentos que beata. ¿Cómo puede cuidarse en el hogar? · Deepthi abundantes líquidos, los suficientes gita para que paiz orina sea de color amarillo shiva o transparente gita el agua. Si tiene [employer], del corazón o del hígado y tiene que Carmelo's líquidos, hable con paiz médico antes de aumentar paiz consumo. · Siga con los líquidos o sumeet dieta suave (arroz sin condimentar, bananas [plátanos], pan sara seco o galletas saladas, puré de [place] ro[place]) hasta que se sienta mejor. Después podrá regresar a los [employer] y aumentar poco a poco la fibra de paiz Laina Herrera. · Póngase en el abdomen sumeet almohadilla térmica ajustada a baja temperatura para aliviar retortijones y afshan leves. · Descanse más hasta que se sienta mejor.   · Sea genesis con los medicamentos. Brittany y siga todas las indicaciones de la [place]. ? Si el médico le recetó un analgésico (medicamento para el dolor), tómelo según las indicaciones. ? Si no está tomando un analgésico recetado, pregúntele a paiz médico si puede peewee vaishnavi de [employer]. · Si paiz médico le recetó antibióticos, tómelos según las indicaciones. No deje de tomarlos por el hecho de sentirse mejor. Debe peewee todos los antibióticos hasta terminarlos. Para prevenir futuros ataques de diverticulitis  · Evite el estreñimiento:  ? Incluya en paiz dieta diaria frutas, verduras, frijoles (habichuelas) y granos integrales. Estos alimentos son ricos en fibra. ? Deepthi abundantes líquidos, los suficientes gita para que paiz orina sea de color amarillo shiva o transparente gita el agua. Si tiene [employer], del corazón o del hígado y tiene que Tamiment's líquidos, hable con paiz médico antes de aumentar paiz consumo. ? Marco A algo de ejercicio todos los lio. Aumente el tiempo en forma gradual hasta 30 a 60 minutos al día, adrianna 5 o más días a la semana. ? Si es necesario, tome un suplemento de [place], gita [employer] o Metamucil, todos los GRASSE. Brittany y siga todas las indicaciones de la [place]. ? Programe tiempo todos los días para evacuar el intestino. Sylwia daly diaria podría ayudar. Tómese paiz tiempo y no se esfuerce cuando esté evacuando. ¿Cuándo debe pedir ayuda? Llame al 911 en cualquier momento que considere que necesita atención de [place]. Por ejemplo, llame si:    · Se desmayó (perdió el conocimiento).      · Vomita debbie o algo parecido a granos de café molido.     · Las heces son de color rojizo o muy sanguinolentas (con debbie).    Llame a paiz médico ahora mismo o busque atención médica inmediata si:    · Tiene dolor intenso o hinchazón en el abdomen.     · Tiene fiebre nueva o más nereyda.     · No puede retener líquidos o medicamentos en el estómago.     · Tiene un dolor nuevo que EDMUND cuando se mueve o tose.    Preste especial atención a los cambios en paiz tawanda y asegúrese de comunicarse con paiz médico si:    · Vuelven a aparecer los síntomas que tuvo la primera vez que se enfermó.     · No mejora gita se esperaba. ¿Dónde puede encontrar más información en inglés? Latasha Cast a http://harvey-roma.info/. Escriba H901 en la búsqueda para aprender más acerca de \"Diverticulitis: Instrucciones de cuidado - [ Diverticulitis: Care Instructions ]. \"  Revisado: [place] 68, 2018  Versión del contenido: 11.9  © 5751-2394 Healthwise, Incorporated. Las instrucciones de cuidado fueron adaptadas bajo licencia por [employer] (which disclaims liability or warranty for this information). Si usted tiene [place] afección médica o sobre estas instrucciones, siempre pregunte a paiz profesional de tawanda. Healthwise, Incorporated niega toda garantía o responsabilidad por paiz uso de esta información.

## 2020-06-20 ENCOUNTER — HOSPITAL ENCOUNTER (EMERGENCY)
Age: 45
Discharge: HOME | End: 2020-06-20
Attending: EMERGENCY MEDICINE
Payer: OTHER GOVERNMENT

## 2020-06-20 ENCOUNTER — APPOINTMENT (OUTPATIENT)
Dept: GENERAL RADIOLOGY | Age: 45
End: 2020-06-20
Attending: NURSE PRACTITIONER
Payer: OTHER GOVERNMENT

## 2020-06-20 VITALS
RESPIRATION RATE: 16 BRPM | HEIGHT: 64 IN | BODY MASS INDEX: 25.61 KG/M2 | TEMPERATURE: 98.7 F | HEART RATE: 79 BPM | DIASTOLIC BLOOD PRESSURE: 79 MMHG | SYSTOLIC BLOOD PRESSURE: 125 MMHG | WEIGHT: 150 LBS | OXYGEN SATURATION: 97 %

## 2020-06-20 DIAGNOSIS — R43.0 ANOSMIA: ICD-10-CM

## 2020-06-20 DIAGNOSIS — Z20.822 PERSON UNDER INVESTIGATION FOR COVID-19: ICD-10-CM

## 2020-06-20 DIAGNOSIS — R05.9 COUGH: Primary | ICD-10-CM

## 2020-06-20 PROCEDURE — 99282 EMERGENCY DEPT VISIT SF MDM: CPT

## 2020-06-20 PROCEDURE — 71045 X-RAY EXAM CHEST 1 VIEW: CPT

## 2020-06-20 NOTE — LETTER
[facility] EMERGENCY DEPT 
[address] [facility] SONJA Peterson 89 
[phone] Work/School Note Date: 6/20/2020 To Whom It May concern: 
 
Rosiland Mcburney was seen and treated today in the emergency room for symptoms of upper respiratory infection and symptoms consistent with COVID 19 by the following provider(s): 
Attending Provider: Eliseo Klein MD 
Nurse Practitioner: Lucrecia Yap NP. Rosiland Mcburney may return to work after COVID results return in 2-4 days. We will contact him. Sincerely, Carlene Larios NP

## 2020-06-20 NOTE — ED TRIAGE NOTES
Patient arrives ambulatory to triage with mask and gloves in place. Patient reports loss of sense of smell and taste for 2 days. Patient reports post nasal drip. Denies fever, denies cough. Unaware of any exposure to persons w/ covid.

## 2020-06-20 NOTE — ED NOTES
I have reviewed discharge instructions with the patient and spouse. The patient and spouse verbalized understanding. Patient left ED via Discharge Method: ambulatory to Home with [address] for questions and clarification provided. Patient given 0 scripts. To continue your aftercare when you leave the hospital, you may receive an automated call from our care team to check in on how you are doing. This is a free service and part of our promise to provide the best care and service to meet your aftercare needs.  If you have questions, or wish to unsubscribe from this service please call [phone]. Thank you for Choosing our [facility] Emergency Department.

## 2020-06-20 NOTE — DISCHARGE INSTRUCTIONS
Patient Education        Tos: Instrucciones de cuidado  Cough: Care Instructions  Instrucciones de cuidado    La tos es [place] de paiz cuerpo a algo que molesta en la garganta o las vías respiratorias. La tos puede ser provocada por muchas cosas. Usted podría toser debido a un resfriado o sumeet gripe, sumeet bronquitis o el asma. Fumar, el goteo posnasal, las alergias y el ácido estomacal que regresa a paiz garganta también pueden causar tos. La tos es un síntoma, no sumeet enfermedad. En la [employer], la tos cesa cuando desaparece la causa, gita un resfriado. Puede peewee algunas medidas en paiz hogar para toser menos y sentirse mejor. La atención de seguimiento es sumeet parte clave de paiz tratamiento y seguridad. Asegúrese de hacer y acudir a todas las citas, y llame a paiz médico si está teniendo problemas. También es sumeet buena idea saber los resultados de delia exámenes y mantener sumeet lista de los medicamentos que beata. ¿Cómo puede cuidarse en el hogar? · Deepthi abundante agua y otros líquidos. [place] ayuda a [employer] mucosidad sea menos espesa y [place] la garganta seca o adolorida. La miel o el jugo de nadine en [place] o té podrían aliviar sumeet tos seca. · Sr International medicamentos para la tos según las indicaciones de paiz médico.  · Eleve la paulo sobre almohadas para ayudarle a respirar y aliviar la tos seca. · Pruebe pastillas para la tos para aliviar la garganta seca o adolorida. Las pastillas para la tos no detienen la tos. Las pastillas para la tos medicinales saborizadas no son mejores que las pastillas con sabor a aneudy o los caramelos duros. · No fume. Evite el humo de tabaco ambiental. Si necesita ayuda para dejar de fumar, hable con paiz médico sobre programas y medicamentos para dejar de fumar. Estos pueden aumentar delia probabilidades de dejar el hábito para siempre. ¿Cuándo debe pedir ayuda? Ness [id number] en cualquier momento que considere que necesita atención de [place].  Por ejemplo, ness si:  · Tiene graves dificultades para respirar. Llame a paiz médico ahora mismo o busque atención médica inmediata si:  · Tose debbie. · Media Chessman dificultades para respirar o estas Jean Sang. · Tiene fiebre nueva o más nereyda. · Tiene un salpullido nuevo. Preste especial atención a los cambios en paiz tawanda y asegúrese de comunicarse con paiz médico si:  · Paiz tos es más profunda o más frecuente que antes, especialmente si nota más mucosidad o un cambio en el color de la mucosidad. · Tiene nuevos síntomas, gita dolor de garganta, dolor de oídos o dolor en los senos paranasales. · No mejora gita se esperaba. ¿Dónde puede encontrar más información en inglés? Vaya a http://harvey-roma.info/  Mauro [id number] en la búsqueda para aprender más acerca de \"Tos: Instrucciones de cuidado. \"  Revisado: 24 febrero, 2020               Versión del contenido: 12.5  © 2006-2020 Healthwise, Incorporated. Las instrucciones de cuidado fueron adaptadas bajo licencia por Good Help Connections (which disclaims liability or warranty for this information). Si usted tiene [place] afección médica o sobre estas instrucciones, siempre pregunte a paiz profesional de tawanda. Healthwise, Incorporated niega toda garantía o responsabilidad por paiz uso de esta información. Patient Education        Aprenda sobre el coronavirus (KSFRP-20)  Learning About Coronavirus (COVID-19)  Coronavirus ([address]): Generalidades  ¿Qué es el coronavirus (LVADZ-70)? La enfermedad por coronavirus (COVID-19) es causada por un virus. Es sumeet enfermedad que se detectó por [facility] en [facility] [place], en Bern[place] de 2019. Desde entonces se ha extendido por todo el roxanna. El virus puede causar [place], tos y dificultad para respirar. En casos graves, puede causar neumonía y hacer que sea difícil respirar sin [facility]. Puede causar la muerte. Los coronavirus son un gran dc de virus. Provocan el resfriado.  También causan enfermedades más graves gita el síndrome respiratorio de [place] (MERS, por delia siglas en inglés) y el síndrome respiratorio socorro grave (SARS, por delia siglas en inglés). COVID-19 es causada por un nuevo coronavirus. [place] significa que es un nuevo tipo que no se había visto antes [facility]. El virus se transmite de persona a persona a través de gotitas por toser y estornudar. También puede transmitirse cuando usted está cerca de sumeet persona que está infectada. Y puede transmitirse cuando toca algo que contiene el virus, gita el pomo de sumeet hitesh o sumeet moore. ¿Qué puede usted hacer para protegerse del coronavirus (UCYXB-86)? La mejor manera de evitar enfermarse es hacer lo siguiente:  · Evitar las áreas en las que haya un brote. · Evitar el contacto con personas que pudieran estar infectadas. · Jaky Gall mina a menudo con jabón o desinfectantes de mina a base de alcohol. · [address] [facility] y tratar de mantenerse al menos a 6 pies de distancia de otras personas. · Jaky Gall mina a menudo, especialmente después de toser o estornudar. Use agua y [place], y frótese las mina por al menos 20 segundos. Si no tiene a paiz alcance agua y jabón, use un desinfectante de mina a base de alcohol. · Evitar tocarse la boca, la nariz y los ojos. ¿Qué puede usted hacer para evitar contagiar el virus a otras personas? Para ayudar a evitar la transmisión del virus a otras personas:  · Cúbrase la boca con un pañuelo de papel cuando tosa o estornude. Luego tire el pañuelo a la basura. · Use un desinfectante para limpiar las cosas que toca con frecuencia. · Use sumeet cubierta facial de gypsy si tiene que ir a lugares públicos. · Quédese en casa si está enfermo o se ha expuesto al virus. No vaya a la escuela, al Sharvivian Madi ni a lugares públicos. Y no use el transporte público, transportes compartidos ni taxis a menos que no tenga otra opción.   · Si está enfermo:  ? Salga de paiz casa solamente si tiene que [facility] médica. Funmi llame al consultorio médico ivy para que sepan que está por ir. Y use sumeet cubierta facial.  ? Use la cubierta facial toda vez que esté en presencia de otras personas. Puede ayudar a detener la propagación del virus cuando tose o estornuda. ? Limpie y desinfecte paiz hogar todos los días. Use limpiadores domésticos y toallitas o aerosoles desinfectantes. Tenga especial cuidado de limpiar las cosas que sujeta con [facility]. Estas incluyen perillas de natalio, controles remotos, teléfonos y manijas del refrigerador y el microondas. Y no olvide las encimeras, [place], enedina y teclados de computadora. Cuándo pedir [employer]  en cualquier momento que considere que necesita atención de [place]. Por ejemplo, llame si:  · Tiene grave dificultad para respirar. (No puede hablar en absoluto). · Tiene dolor o presión amor en el pecho. · Está muy mareado o aturdido. · Está confuso o no puede pensar con claridad. · Tiene un tinte [employer] alysa y en los labios. · Se desmaya (pierde el conocimiento) o tiene muchas dificultades para despertarse. Llame a paiz médico ahora mismo si comienza a presentar síntomas gita:  · Falta de aire. · Lauretha Caitlin. · Tos. Si necesita obtener [facility] [facility], llame ivy al [employer] del médico para que le den instrucciones antes de ir. Asegúrese de usar sumeet cubierta facial para evitar exponer a otras personas al virus. ¿Dónde puede obtener información actualizada? Las siguientes organizaciones de tawanda están siguiendo y estudiando lis virus. Delia sitios web contienen la información más actualizada. También obtendrá información sobre qué hacer si pau que puede haberse expuesto al virus. · Centros para el Control y la Prevención de Enfermedades de los EE. UU. (CDC, por delia siglas en inglés): Los CDC proporcionan noticias actualizadas sobre la enfermedad y consejos para viajeros. El sitio web también le informa cómo prevenir la propagación de la infección. www.cdc.gov  · [address] (OMS): La OMS ofrece información sobre los brotes del virus. La OMS también merle consejos para viajeros. www.who.int  Revisado: 8 mayo, 2020               Versión del contenido: 12.5  © 2006-2020 Healthwise, Incorporated. Las instrucciones de cuidado fueron adaptadas bajo licencia por Good Help Connections (which disclaims liability or warranty for this information). Si usted tiene [place] afección médica o sobre estas instrucciones, siempre pregunte a paiz profesional de tawanda. Healthwise, Incorporated niega toda garantía o responsabilidad por paiz uso de esta información. Patient Education        Coronavirus (LIPTI-43): Instrucciones de cuidado  Coronavirus (YNEYU-73): Care Instructions  Generalidades  La enfermedad por coronavirus (COVID-19) es causada por un virus. Los síntomas pueden incluir [place], tos y falta de aire. Se transmite principalmente de persona a persona a través de las gotitas de la tos y los estornudos. El virus también puede transmitirse cuando las personas están en contacto cercano con alguien que tiene la infección. La mayoría de las personas tienen síntomas leves y pueden cuidarse en paiz casa. Si delia síntomas empeoran, podrían necesitar atención en un hospital. No hay ningún medicamento para combatir el virus. Es importante no transmitir el virus a otras personas. Si usted tiene COVID-19, use [place] facial toda vez que esté en presencia de [place]. Usted necesita aislarse mientras está enfermo. [address] locales de [facility] dirán cuando ya no necesite estar aislado. Salga de paiz casa solamente si necesita obtener [employer]. La atención de seguimiento es sumeet parte clave de paiz tratamiento y seguridad. Asegúrese de hacer y acudir a todas las citas, y llame a paiz médico si está teniendo problemas.  También es sumeet buena idea saber los resultados de delia exámenes y [employer] lista de los medicamentos que beata. ¿Cómo puede cuidarse en el hogar? · Descanse más de lo habitual. Puede ayudarle a sentirse mejor. · Deepthi mucho líquido. [place] ayuda a [employer] se milton perdido debido a la fiebre. Beber líquido también ayuda a aliviar la irritación en la garganta. [place], la sopa, el jugo de frutas y el té caliente con Becca Footman son Rolley Countess opciones. · Tillar acetaminofén (gita Tylenol) para reducir la fiebre. También podría [employer]. [place] y siga todas las instrucciones de la [place]. · Pásese sumeet esponja por todo el cuerpo con agua entre tibia y fresca para aliviar la fiebre. No use agua fría ni hielo. · Use vaselina en la piel adolorida. [place] puede ayudar si la piel de alrededor de la nariz y los labios le arde por frotársela demasiado con pañuelos de papel. Consejos para el aislamiento  · Use sumeet cubierta facial de gypsy cuando esté en presencia de [employer]. [place] puede ayudar a detener la propagación del virus cuando tose o estornuda. · Limite el contacto con las personas en paiz hogar. Si es posible, quédese en sumeet habitación separada y use un baño separado. · Si tiene que salir de casa, evite las multitudes y trate de mantenerse al menos a 6 pies de distancia de otras personas. · Evite el contacto con mascotas y [place]. · Cúbrase la boca y la Alphonsa Steve con un pañuelo cuando tosa o estornude. Luego tírelo a la basura de inmediato. · [place] con frecuencia, especialmente después de toser o estornudar. Use agua y Is[place], y fróteselas adrianna al menos 20 segundos. Si no tiene agua y jabón disponibles, use un desinfectante para mina a base de alcohol. · No comparta artículos personales en el hogar. Estos incluyen ropa de cama, toallas, tazas y vasos, y utensilios para comer. · Lave la ropa en el agua más caliente permitida para el tipo de gypsy y séquela por completo. Está kirsty jose maria la ropa de otras personas junto con la suya.   · Limpie y desinfecte paiz hogar todos los días. Use limpiadores domésticos y toallitas o aerosoles desinfectantes. Tenga especial cuidado de limpiar las cosas que agarra con las [place]. Estas incluyen perillas de natalio, controles remotos, teléfonos y manijas del refrigerador y microondas. Y no olvide las encimeras, [place], enedina y teclados de computadora. ¿Cuándo debe pedir ayuda? Llame [id number] en cualquier momento que considere que necesita atención de [place]. Por ejemplo, llame si tiene síntomas potencialmente mortales, por ejemplo:  · Tiene grave dificultad para respirar. (No puede hablar en absoluto). · Tiene dolor o presión amor en el pecho. · Está seriamente mareado o aturdido. · Está confuso o no puede pensar con claridad. · Tiene un tinte [employer] alysa y en los labios. · Se desmaya (pierde el conocimiento) o tiene muchas dificultades para despertarse. Llame a paiz médico ahora mismo o busque atención médica inmediata si:  · Tiene dificultad moderada para respirar. (No puede decir sumeet frase completa). · Tose con debbie (más de alrededor de 1 cucharadita). · Tiene señales de presión arterial baja. Estas incluyen sentirse mareado; estar demasiado débil para estar de pie; y [place] la piel fría, pálida y [place]. Preste especial atención a los cambios en paiz tawanda y asegúrese de comunicarse con paiz médico si:  · Delia síntomas Charbel Shawl. · No mejora gita se esperaba. Llame antes de ir al consultorio médico. Siga delia instrucciones. Y use sumeet cubierta facial de gypsy. Revisado: 8 mayo, 2020               Versión del contenido: 12.5  © 4411-4408 Healthwise, [employer]. Las instrucciones de cuidado fueron adaptadas bajo licencia por Good Help Connections (which disclaims liability or warranty for this information). Si usted tiene [place] afección médica o sobre estas instrucciones, siempre pregunte a paiz profesional de tawanda.  [employer], [employer] niega toda garantía o responsabilidad por paiz uso de [employer]. home with family    Follow instructions above. Self isolate until you hear from us. We will contact you with your results. Do not call, we will call you. Wash surfaces often. If your symptoms worsen, return to ED or your family doctor.    Work note

## 2020-06-20 NOTE — ED PROVIDER NOTES
39 y/o m to ed with complaint of one week hsx of runny nose, cough prod yellow sputum, no sob or wheezing, but has since lost taste and smell sense. No fever or chills, no n/v/d. No diff with void or bm. Some sinus drainage. No sinus pain. Taking po well. Concerned about covid           Past Medical History:   Diagnosis Date    GERD (gastroesophageal reflux disease)     GERD (gastroesophageal reflux disease)     Hypercholesterolemia        No past surgical history on file.       Family History:   Problem Relation Age of Onset    Diabetes Mother     Cancer Father     Diabetes Maternal Grandmother        Social History     Socioeconomic History    Marital status:      Spouse name: Not on file    Number of children: Not on file    Years of education: Not on file    Highest education level: Not on file   Occupational History    Not on file   Social Needs    Financial resource strain: Not on file    Food insecurity     Worry: Not on file     Inability: Not on file    Transportation needs     Medical: Not on file     Non-medical: Not on file   Tobacco Use    Smoking status: Current Every Day Smoker     Packs/day: 0.25     Years: 0.50     Pack years: 0.12    Smokeless tobacco: Never Used   Substance and Sexual Activity    Alcohol use: No     Alcohol/week: 0.0 standard drinks    Drug use: No    Sexual activity: Yes     Partners: Female   Lifestyle    Physical activity     Days per week: Not on file     Minutes per session: Not on file    Stress: Not on file   Relationships    Social connections     Talks on phone: Not on file     Gets together: Not on file     Attends Advent service: Not on file     Active member of club or organization: Not on file     Attends meetings of clubs or organizations: Not on file     Relationship status: Not on file    Intimate partner violence     Fear of current or ex partner: Not on file     Emotionally abused: Not on file     Physically abused: Not on file     Forced sexual activity: Not on file   Other Topics Concern    Not on file   Social History Narrative    ** Merged History Encounter **         ** Merged History Encounter **              ALLERGIES: Patient has no known allergies. Review of Systems   Constitutional: Positive for appetite change. Negative for chills and fever. HENT: Positive for postnasal drip and rhinorrhea. Negative for ear pain, sore throat and trouble swallowing. Eyes: Negative for discharge and redness. Respiratory: Positive for cough. Negative for wheezing. Cardiovascular: Negative for chest pain and palpitations. Gastrointestinal: Negative for nausea and vomiting. Genitourinary: Negative for difficulty urinating and dysuria. Musculoskeletal: Negative for back pain and myalgias. Skin: Negative for color change and rash. Neurological: Negative for dizziness and speech difficulty. Psychiatric/Behavioral: Negative for confusion and decreased concentration. Vitals:    06/20/20 1356   BP: 125/79   Pulse: 79   Resp: 16   Temp: 98.7 °F (37.1 °C)   SpO2: 97%   Weight: 68 kg (150 lb)   Height: 5' 4\" (1.626 m)            Physical Exam  Vitals signs and nursing note reviewed. Constitutional:       Appearance: Normal appearance. HENT:      Head: Normocephalic and atraumatic. Right Ear: Tympanic membrane, ear canal and external ear normal.      Left Ear: Tympanic membrane, ear canal and external ear normal.      Nose: Rhinorrhea present. Mouth/Throat:      Mouth: Mucous membranes are moist.      Pharynx: Posterior oropharyngeal erythema present. Eyes:      Extraocular Movements: Extraocular movements intact. Pupils: Pupils are equal, round, and reactive to light. Neck:      Musculoskeletal: Normal range of motion. Cardiovascular:      Rate and Rhythm: Normal rate and regular rhythm. Pulses: Normal pulses. Heart sounds: Normal heart sounds.    Pulmonary:      Effort: Pulmonary effort is normal.      Breath sounds: Normal breath sounds. No wheezing or rhonchi. Musculoskeletal: Normal range of motion. Skin:     General: Skin is warm and dry. Neurological:      General: No focal deficit present. Mental Status: He is alert and oriented to person, place, and time. Psychiatric:         Mood and Affect: Mood normal.         Behavior: Behavior normal.         Thought Content: Thought content normal.         Judgment: Judgment normal.          MDM  Number of Diagnoses or Management Options  Anosmia:   Cough:   Person under investigation for COVID-19:   Diagnosis management comments: Will eval cxr and covid swab. Not hypoxic.   Looks good  5:11 PM cxr neg  Dc to home to self isolate       Amount and/or Complexity of Data Reviewed  Clinical lab tests: ordered  Tests in the radiology section of CPT®: ordered and reviewed    Risk of Complications, Morbidity, and/or Mortality  Presenting problems: minimal  Diagnostic procedures: minimal  Management options: minimal    Patient Progress  Patient progress: stable         Procedures

## 2020-06-23 ENCOUNTER — PATIENT OUTREACH (OUTPATIENT)
Dept: CASE MANAGEMENT | Age: 45
End: 2020-06-23

## 2020-06-23 LAB — EMERGENT DISEASE PANEL, EDPR: DETECTED

## 2020-06-23 NOTE — PROGRESS NOTES
The patient was called for notification of a POSITIVE test result for COVID-19. The following information was given to the patient:    The COVID-19 test result was positive  Mild and stable symptoms are managed at home    Treatment of coronavirus does not require an antibiotic  Remain isolated for 10 days since symptoms first appeared AND at least 3 days have passed after recovery    Recovery is defined as resolution of fever without the use of fever-reducing medications with progressive improvement or resolution of other symptoms    Wash hands often with soap and water for at least 20 seconds or alternatively use hand  with at least 60% alcohol content  Cover coughs and sneezes  Wear a mask when around others if possible  Clean all high-touch surfaces every day, such as doorknobs and cellphones  Continually monitor symptoms.  Contact your medical provider if symptoms are worsening, such as difficulty breathing  For more information visit the CDC website: DotProtection.gl

## 2020-06-24 NOTE — PROGRESS NOTES
Patient contacted regarding recent visit for viral symptoms. This Mark Julian contacted the patient by telephone to perform post discharge call. Verified name and  with patient as identifiers. Provided introduction to self, and reason for call due to viral symptoms of infection and/or exposure to COVID-19. Discussed COVID-19 related testing which was available at this time. Test results were positive. Patient informed of results, if available? Previously done     Patient presented to emergency department/flu clinic with complaints of viral symptoms/exposure to COVID. Patient reports symptoms are unchanged. Due to no worsening symptoms the RN CTN/IMELDA was not notified for escalation. Discussed exposure protocols and quarantine with CDC Guidelines What To Do If You Are Sick    patient was given an opportunity for questions and concerns. Stay home except to get medical care  Separate yourself from other people and animals in your home  Call ahead before visiting your doctor  Wear a facemask  Cover your coughs and sneezes  Clean your hands often  Avoid sharing personal household items  Clean all high-touch surfaces everyday    Monitor your symptoms  Seek prompt medical attention if your illness is worsening (e.g., difficulty breathing). Before seeking care, call your healthcare provider and tell them that you have, or are being evaluated for, COVID-19. Put on a facemask before you enter the facility. These steps will help the healthcare provider's office to keep other people in the office or waiting room from getting infected or exposed. Ask your healthcare provider to call the local or state health department. Persons who are placed under If you have a medical emergency and need to call 911, notify the dispatch personnel that you have, or are being evaluated for COVID-19. If possible, put on a facemask before emergency medical services arrive.     The patient agrees to contact the [facility] exposure line [phone], [facility] at [phone] and PCP office for questions related to their healthcare. Author provided contact information for future reference. Patient/family/caregiver given information for [employer] and agrees to enroll Yes  Patient preferred e-mail: [email or web address]  Patient preferred phone contact: [phone]   Based on Loop alert triggers, patient will be contacted by nurse care manager for worsening symptoms.